# Patient Record
Sex: FEMALE | Race: WHITE | Employment: FULL TIME | URBAN - METROPOLITAN AREA
[De-identification: names, ages, dates, MRNs, and addresses within clinical notes are randomized per-mention and may not be internally consistent; named-entity substitution may affect disease eponyms.]

---

## 2018-08-03 ENCOUNTER — HOSPITAL ENCOUNTER (EMERGENCY)
Age: 33
Discharge: HOME | End: 2018-08-03
Attending: EMERGENCY MEDICINE
Payer: SELF-PAY

## 2018-08-03 VITALS
HEIGHT: 62 IN | RESPIRATION RATE: 16 BRPM | DIASTOLIC BLOOD PRESSURE: 98 MMHG | WEIGHT: 230 LBS | SYSTOLIC BLOOD PRESSURE: 170 MMHG | HEART RATE: 100 BPM | TEMPERATURE: 98.5 F | BODY MASS INDEX: 42.33 KG/M2

## 2018-08-03 DIAGNOSIS — R03.0 ELEVATED BLOOD PRESSURE READING WITHOUT DIAGNOSIS OF HYPERTENSION: ICD-10-CM

## 2018-08-03 DIAGNOSIS — S05.01XA: Primary | ICD-10-CM

## 2018-08-03 PROCEDURE — 74011250637 HC RX REV CODE- 250/637: Performed by: PHYSICIAN ASSISTANT

## 2018-08-03 PROCEDURE — 99282 EMERGENCY DEPT VISIT SF MDM: CPT

## 2018-08-03 PROCEDURE — 74011000250 HC RX REV CODE- 250: Performed by: PHYSICIAN ASSISTANT

## 2018-08-03 RX ORDER — ONDANSETRON 8 MG/1
8 TABLET, ORALLY DISINTEGRATING ORAL
Status: COMPLETED | OUTPATIENT
Start: 2018-08-03 | End: 2018-08-03

## 2018-08-03 RX ORDER — PROPARACAINE HYDROCHLORIDE 5 MG/ML
1 SOLUTION/ DROPS OPHTHALMIC
Status: COMPLETED | OUTPATIENT
Start: 2018-08-03 | End: 2018-08-03

## 2018-08-03 RX ORDER — CIPROFLOXACIN HYDROCHLORIDE 3.5 MG/ML
1 SOLUTION/ DROPS TOPICAL 4 TIMES DAILY
Qty: 2.5 ML | Refills: 0 | Status: SHIPPED | OUTPATIENT
Start: 2018-08-03

## 2018-08-03 RX ORDER — OXYCODONE AND ACETAMINOPHEN 5; 325 MG/1; MG/1
1 TABLET ORAL
Qty: 8 TAB | Refills: 0 | Status: SHIPPED | OUTPATIENT
Start: 2018-08-03

## 2018-08-03 RX ORDER — OXYCODONE AND ACETAMINOPHEN 5; 325 MG/1; MG/1
1 TABLET ORAL
Status: COMPLETED | OUTPATIENT
Start: 2018-08-03 | End: 2018-08-03

## 2018-08-03 RX ORDER — ONDANSETRON 4 MG/1
4 TABLET, ORALLY DISINTEGRATING ORAL
Qty: 8 TAB | Refills: 0 | Status: SHIPPED | OUTPATIENT
Start: 2018-08-03

## 2018-08-03 RX ADMIN — OXYCODONE HYDROCHLORIDE AND ACETAMINOPHEN 1 TABLET: 5; 325 TABLET ORAL at 16:56

## 2018-08-03 RX ADMIN — FLUORESCEIN SODIUM 1 STRIP: 0.6 STRIP OPHTHALMIC at 16:56

## 2018-08-03 RX ADMIN — ONDANSETRON 8 MG: 8 TABLET, ORALLY DISINTEGRATING ORAL at 16:56

## 2018-08-03 RX ADMIN — PROPARACAINE HYDROCHLORIDE 1 DROP: 5 SOLUTION/ DROPS OPHTHALMIC at 16:56

## 2018-08-03 NOTE — DISCHARGE INSTRUCTIONS
Corneal Scratches: Care Instructions  Your Care Instructions    The cornea is the clear surface that covers the front of the eye. When a speck of dirt, a wood chip, an insect, or another object flies into your eye, it can cause a painful scratch on the cornea. Wearing contact lenses too long or rubbing your eyes can also scratch the cornea. Small scratches usually heal in a day or two. Deeper scratches may take longer. If you have had a foreign object removed from your eye or you have a corneal scratch, you will need to watch for infection and vision problems while your eye heals. Follow-up care is a key part of your treatment and safety. Be sure to make and go to all appointments, and call your doctor if you are having problems. It's also a good idea to know your test results and keep a list of the medicines you take. How can you care for yourself at home? · The doctor probably used a medicine during your exam to numb your eye. When it wears off in 30 to 60 minutes, your eye pain may come back. Take pain medicines exactly as directed. ¨ If the doctor gave you a prescription medicine for pain, take it as prescribed. ¨ If you are not taking a prescription pain medicine, ask your doctor if you can take an over-the-counter medicine. ¨ Do not take two or more pain medicines at the same time unless the doctor told you to. Many pain medicines have acetaminophen, which is Tylenol. Too much acetaminophen (Tylenol) can be harmful. · Do not rub your injured eye. Rubbing can make it worse. · Use the prescribed eyedrops or ointment as directed. Be sure the dropper or bottle tip is clean. To put in eyedrops or ointment:  ¨ Tilt your head back, and pull your lower eyelid down with one finger. ¨ Drop or squirt the medicine inside the lower lid. ¨ Close your eye for 30 to 60 seconds to let the drops or ointment move around. ¨ Do not touch the ointment or dropper tip to your eyelashes or any other surface.   · Do not use your contact lens in your hurt eye until your doctor says you can. Also, do not wear eye makeup until your eye has healed. · Do not drive if you have blurred vision. · Bright light may hurt. Sunglasses can help. · To prevent eye injuries in the future, wear safety glasses or goggles when you work with machines or tools, mow the lawn, or ride a bike or motorcycle. When should you call for help? Call your doctor now or seek immediate medical care if:    · You have signs of an eye infection, such as:  ¨ Pus or thick discharge coming from the eye. ¨ Redness or swelling around the eye. ¨ A fever.     · You have new or worse eye pain.     · You have vision changes.     · It feels like there is something in your eye.     · Light hurts your eye.    Watch closely for changes in your health, and be sure to contact your doctor if:    · You do not get better as expected. Where can you learn more? Go to http://brooks-denis.info/. Enter E629 in the search box to learn more about \"Corneal Scratches: Care Instructions. \"  Current as of: December 3, 2017  Content Version: 11.7  © 0996-1453 [employer]. Care instructions adapted under license by [employer] (which disclaims liability or warranty for this information). If you have questions about a medical condition or this instruction, always ask your healthcare professional. [place] [address] any warranty or liability for your use of this information.

## 2018-08-03 NOTE — ED NOTES
Current Discharge Medication List  
  
START taking these medications Details  
ciprofloxacin HCl (CILOXAN) 0.3 % ophthalmic solution Apply 1 Drop to eye four (4) times daily. Qty: 2.5 mL, Refills: 0  
  
oxyCODONE-acetaminophen (PERCOCET) 5-325 mg per tablet Take 1 Tab by mouth every four (4) hours as needed for Pain. Max Daily Amount: 6 Tabs. Qty: 8 Tab, Refills: 0 Associated Diagnoses: Injury of right conjunctiva and corneal abrasion, initial encounter  
  
ondansetron (ZOFRAN ODT) 4 mg disintegrating tablet Take 1 Tab by mouth every eight (8) hours as needed for Nausea. Qty: 8 Tab, Refills: 0 Patient armband removed and shredded prescriptions given and reviewed with patient.

## 2018-08-03 NOTE — ED PROVIDER NOTES
EMERGENCY DEPARTMENT HISTORY AND PHYSICAL EXAM 
 
4:43 PM 
 
 
Date: 8/3/2018 Patient Name: Lorraine Queen History of Presenting Illness Chief Complaint Patient presents with [address] [place] [address] Injury History Provided By: Patient Chief Complaint: right eye pain Duration: 2 Days Timing:  Acute Location: right eye Quality: Sean Luna Severity: 9 out of 10 Modifying Factors: tylenol not helping Associated Symptoms: nausea and vomiting, headache, blurred vision, tearing Additional History (Context): Lorraine Queen is a 28 y.o. female with No significant past medical history who presents with r5ight eye pain x 2 days. She scratched her eye taking out her contact. She has not worn it since then. She has blurred vision and light sensitivity. She has history of migraines and has a mild headache right now. She has been vomiting due to the severity of the pain in her eye. She denies abdominal pain. She denies loss of vision. Her ophthalmologist is [facility] \[facility]\"" Physicians. PCP: None Current Outpatient Prescriptions Medication Sig Dispense Refill  ciprofloxacin HCl (CILOXAN) 0.3 % ophthalmic solution Apply 1 Drop to eye four (4) times daily. 2.5 mL 0  
 oxyCODONE-acetaminophen (PERCOCET) 5-325 mg per tablet Take 1 Tab by mouth every four (4) hours as needed for Pain. Max Daily Amount: 6 Tabs. 8 Tab 0  
 ondansetron (ZOFRAN ODT) 4 mg disintegrating tablet Take 1 Tab by mouth every eight (8) hours as needed for Nausea. 8 Tab 0 Past History Past Medical History: No past medical history on file. Past Surgical History: No past surgical history on file. Family History: No family history on file. Social History: 
Social History Substance Use Topics  Smoking status: Not on file  Smokeless tobacco: Not on file  Alcohol use Not on file Allergies: Allergies Allergen Reactions  Sulfabenzamide Nausea and Vomiting Review of Systems Review of Systems Constitutional: Negative for fever. HENT: Negative for facial swelling. Eyes: Positive for photophobia, pain, discharge, redness and visual disturbance. Respiratory: Negative for shortness of breath. Cardiovascular: Negative for chest pain. Gastrointestinal: Positive for nausea and vomiting. Negative for abdominal pain. Genitourinary: Negative for dysuria. Musculoskeletal: Negative for neck pain. Skin: Negative for rash. Neurological: Positive for headaches. Negative for dizziness. Psychiatric/Behavioral: Negative for confusion. All other systems reviewed and are negative. Physical Exam  
 
Visit Vitals  BP (!) 170/98 (BP 1 Location: Left arm)  Pulse 100  Temp 98.5 °F (36.9 °C)  Resp 16  
 Ht 5' 2\" (1.575 m)  Wt 104.3 kg (230 lb)  BMI 42.07 kg/m2 Physical Exam  
Constitutional: She is oriented to person, place, and time. She appears well-developed and well-nourished. No distress. HENT:  
Head: Normocephalic and atraumatic. Eyes: EOM are normal. Pupils are equal, round, and reactive to light. Right eye exhibits no chemosis. Left eye exhibits no chemosis. Right conjunctiva is injected. Right conjunctiva has no hemorrhage. Slit lamp exam: The right eye shows corneal abrasion. The right eye shows no corneal flare, no corneal ulcer, no foreign body and no hyphema. Small right corneal abrasion Neck: Normal range of motion. Cardiovascular: Normal rate and regular rhythm. Pulmonary/Chest: Effort normal.  
Abdominal: She exhibits no distension. Musculoskeletal: Normal range of motion. Neurological: She is alert and oriented to person, place, and time. No cranial nerve deficit. Skin: Skin is warm and dry. She is not diaphoretic. Psychiatric: She has a normal mood and affect. Nursing note and vitals reviewed. Diagnostic Study Results Labs - No results found for this or any previous visit (from the past 12 hour(s)). Radiologic Studies - No orders to display Medical Decision Making I am the first provider for this patient. I reviewed the vital signs, available nursing notes, past medical history, past surgical history, family history and social history. Vital Signs-Reviewed the patient's vital signs. Records Reviewed: Nursing Notes (Time of Review: 4:43 PM) ED Course: Progress Notes, Reevaluation, and Consults: 
 
 
Provider Notes (Medical Decision Making): MDM Number of Diagnoses or Management Options Elevated blood pressure reading without diagnosis of hypertension:  
Injury of right conjunctiva and corneal abrasion, initial encounter:  
Diagnosis management comments: No contacts until cleared by ophtho. Treated with cipro and pain meds. Has had BP monitored by PCP regularly. Diagnosis Clinical Impression:  
1. Injury of right conjunctiva and corneal abrasion, initial encounter 2. Elevated blood pressure reading without diagnosis of hypertension Disposition:  
 
Follow-up Information Follow up With Details Comments Contact Info [facility] Schedule an appointment as soon as possible for a visit  [address] [place] [address], [address] [place] [address]) [phone] PCP Schedule an appointment as soon as possible for a visit Patient's Medications Start Taking CIPROFLOXACIN HCL (CILOXAN) 0.3 % OPHTHALMIC SOLUTION    Apply 1 Drop to eye four (4) times daily. ONDANSETRON (ZOFRAN ODT) 4 MG DISINTEGRATING TABLET    Take 1 Tab by mouth every eight (8) hours as needed for Nausea. OXYCODONE-ACETAMINOPHEN (PERCOCET) 5-325 MG PER TABLET    Take 1 Tab by mouth every four (4) hours as needed for Pain. Max Daily Amount: 6 Tabs. Continue Taking No medications on file These Medications have changed No medications on file Stop Taking No medications on file  
 
_______________________________ Attestations: 
Sharifa Attestation Preston JENIFER Mitchell PA-C acting as a scribe for and in the presence of Lazarus Aden, [employer] August 03, 2018 at 5:26 PM 
    
Provider Attestation:     
I personally performed the services described in the documentation, reviewed the documentation, as recorded by the scribe in my presence, and it accurately and completely records my words and actions. August 03, 2018 at 5:26 PM - Lazarus Aden, PA-C 
_______________________________

## 2022-04-18 NOTE — LETTER
NOTIFICATION OF RETURN TO WORK / SCHOOL 
 
8/3/2018 Ms. Alysha Galvan [address] [address] To Whom it may concern, Please excuse Alysha Galvan from work 08/03/18 - 8/6/18 for medical reasons. She may return to work after cleared to drive by ophthalmology.    
 
 
Sincerely,  
 
 
 
 
Preston Mitchell PA-C 
 
 
 Detail Level: Zone Detail Level: Detailed Detail Level: Simple